# Patient Record
Sex: FEMALE | Race: WHITE | Employment: FULL TIME | URBAN - METROPOLITAN AREA
[De-identification: names, ages, dates, MRNs, and addresses within clinical notes are randomized per-mention and may not be internally consistent; named-entity substitution may affect disease eponyms.]

---

## 2023-11-14 ENCOUNTER — OFFICE VISIT (OUTPATIENT)
Dept: ENT CLINIC | Age: 23
End: 2023-11-14
Payer: COMMERCIAL

## 2023-11-14 VITALS
HEIGHT: 62 IN | DIASTOLIC BLOOD PRESSURE: 75 MMHG | OXYGEN SATURATION: 98 % | HEART RATE: 74 BPM | RESPIRATION RATE: 16 BRPM | WEIGHT: 165 LBS | SYSTOLIC BLOOD PRESSURE: 117 MMHG | BODY MASS INDEX: 30.36 KG/M2

## 2023-11-14 DIAGNOSIS — H93.8X2 SENSATION OF FULLNESS IN LEFT EAR: Primary | ICD-10-CM

## 2023-11-14 DIAGNOSIS — H91.92 HEARING LOSS OF LEFT EAR, UNSPECIFIED HEARING LOSS TYPE: ICD-10-CM

## 2023-11-14 DIAGNOSIS — H93.8X2 POPPING OF LEFT EAR: ICD-10-CM

## 2023-11-14 DIAGNOSIS — H69.92 DYSFUNCTION OF LEFT EUSTACHIAN TUBE: ICD-10-CM

## 2023-11-14 PROCEDURE — 99203 OFFICE O/P NEW LOW 30 MIN: CPT | Performed by: STUDENT IN AN ORGANIZED HEALTH CARE EDUCATION/TRAINING PROGRAM

## 2023-11-14 RX ORDER — NORETHINDRONE ACETATE AND ETHINYL ESTRADIOL .03; 1.5 MG/1; MG/1
1 TABLET ORAL DAILY
COMMUNITY
Start: 2017-12-21

## 2023-11-14 RX ORDER — FLUTICASONE PROPIONATE 50 MCG
2 SPRAY, SUSPENSION (ML) NASAL DAILY
Qty: 16 G | Refills: 3 | Status: SHIPPED | OUTPATIENT
Start: 2023-11-14

## 2023-11-14 RX ORDER — FLUTICASONE PROPIONATE 50 MCG
SPRAY, SUSPENSION (ML) NASAL
COMMUNITY
Start: 2023-10-23 | End: 2023-11-14 | Stop reason: SDUPTHER

## 2023-11-14 RX ORDER — NORETHINDRONE ACETATE AND ETHINYL ESTRADIOL AND FERROUS FUMARATE 1.5-30(21)
KIT ORAL
COMMUNITY
Start: 2023-09-05

## 2023-11-14 NOTE — PROGRESS NOTES
Larry [address] (:  2000) is a 26 y.o. female, here for evaluation of the following chief complaint(s):  Ear Fullness (Bilateral ) and Ear Drainage (Bilateral )      ASSESSMENT/PLAN:  1. Sensation of fullness in left ear  2. Dysfunction of left eustachian tube  3. Popping of left ear  4. Hearing loss of left ear, unspecified hearing loss type      This is a very pleasant 26 y.o. female here today for evaluation of the the above-noted complaints. Patient with left-sided otalgia, aural fullness, popping/clicking, no autophony. Symptoms consistent with left dilatory eustachian tube dysfunction  -Continue fluticasone  -Auto-insufflate ears (\"pop ears\") 4-5 times daily  -Will consider audiogram to determine any conductive hearing loss  -While myringotomy and tympanostomy tube placement is an option, this would be a last resort as this is her first time experiencing this. -RTC in 8 weeks for further evaluation       Medical Decision Making: The following items were considered in medical decision making:  Independent review of images  Review / order clinical lab tests  Review / order radiology tests  Decision to obtain old records  Review and summation of old records as accessed through [facility] if applicable    SUBJECTIVE/OBJECTIVE:  TOM Sellersfrancine is here today for evaluation of ear issues. The patient states that has been ongoing since 2023. Symptoms include left ear fullness, left ear pressure, muffled, mildly decreased hearing and popping or crackling in the left ear. She was seen in the urgent care and told that she had fluid in her ear and started on Flonase. No significant improvement since that time. She reports that she did have a little bit of jaw pain at the time but this is since resolved. The patient has no prior history of ear tubes, recurrent ear infections or other issues.   She does

## 2023-12-13 ENCOUNTER — TELEPHONE (OUTPATIENT)
Dept: ENT CLINIC | Age: 23
End: 2023-12-13

## 2023-12-13 RX ORDER — FLUTICASONE PROPIONATE 50 MCG
2 SPRAY, SUSPENSION (ML) NASAL DAILY
Qty: 16 G | Refills: 3 | Status: SHIPPED | OUTPATIENT
Start: 2023-12-13

## 2023-12-13 NOTE — TELEPHONE ENCOUNTER
Pt calling because she is still having vibrating in her ear. She is unsure if she could continue the flonase that was prescribed at her last visit ( if so, she needs a refill)or schedule an appointment to be re evaluated.

## 2024-01-08 RX ORDER — FLUTICASONE PROPIONATE 50 MCG
2 SPRAY, SUSPENSION (ML) NASAL DAILY
Qty: 16 G | Refills: 3 | Status: SHIPPED | OUTPATIENT
Start: 2024-01-08

## 2024-02-14 ENCOUNTER — OFFICE VISIT (OUTPATIENT)
Dept: FAMILY MEDICINE CLINIC | Age: 24
End: 2024-02-14
Payer: COMMERCIAL

## 2024-02-14 VITALS
SYSTOLIC BLOOD PRESSURE: 118 MMHG | WEIGHT: 165.8 LBS | DIASTOLIC BLOOD PRESSURE: 76 MMHG | HEIGHT: 62 IN | BODY MASS INDEX: 30.51 KG/M2 | TEMPERATURE: 97.2 F

## 2024-02-14 DIAGNOSIS — L50.9 URTICARIAL RASH: ICD-10-CM

## 2024-02-14 DIAGNOSIS — Z86.14 HX OF METHICILLIN RESISTANT STAPHYLOCOCCUS AUREUS INFECTION: Primary | ICD-10-CM

## 2024-02-14 DIAGNOSIS — H93.8X3 EAR POPPING, BILATERAL: ICD-10-CM

## 2024-02-14 PROCEDURE — 99203 OFFICE O/P NEW LOW 30 MIN: CPT

## 2024-02-14 RX ORDER — DOXYCYCLINE HYCLATE 100 MG
100 TABLET ORAL 2 TIMES DAILY
Qty: 20 TABLET | Refills: 0 | Status: SHIPPED | OUTPATIENT
Start: 2024-02-14 | End: 2024-02-24

## 2024-02-14 RX ORDER — TRIAMCINOLONE ACETONIDE 0.25 MG/G
OINTMENT TOPICAL
Qty: 80 G | Refills: 1 | Status: SHIPPED | OUTPATIENT
Start: 2024-02-14 | End: 2024-02-21

## 2024-02-14 NOTE — ASSESSMENT & PLAN NOTE
HX of MRSA infections. Will initiate doxycyline BID 10 days for possible mrsa, continue warm baths. Patient advised to go to ED of symptoms of systemic infection occur, fevers, chills or boil get worse.

## 2024-02-14 NOTE — PROGRESS NOTES
Rosalee Thurman (:  2000) is a 23 y.o. female,Established patient, here for evaluation of the following chief complaint(s):  New Patient, Ear Problem (Crackling in both ears), Rash (Itchy red raised rash on chest), Cyst (History of MRSA), and Fatigue      ASSESSMENT/PLAN:  1. Hx of methicillin resistant Staphylococcus aureus infection  Assessment & Plan:    HX of MRSA infections. Will initiate doxycyline BID 10 days for possible mrsa, continue warm baths. Patient advised to go to ED of symptoms of systemic infection occur, fevers, chills or boil get worse.  Orders:  -     triamcinolone (KENALOG) 0.025 % ointment; Apply topically 2 times daily., Disp-80 g, R-1, Normal  -     doxycycline hyclate (VIBRA-TABS) 100 MG tablet; Take 1 tablet by mouth 2 times daily for 10 days, Disp-20 tablet, R-0Normal  2. Ear popping, bilateral  Assessment & Plan:    Will refer to Dr. Joyce- patient wanted second opinion. Symptoms consistent with possible TMJ, patient having trouble opening jaw. Will go to ENT for further eval.  Orders:  -     Miguel He MD, Otolaryngology, [facility] [place]  3. Urticarial rash  Assessment & Plan:    Kenalog cream BID for 2 weeks and continue claritin. Call if symptoms fail to improve.      Return if symptoms worsen or fail to improve.    SUBJECTIVE/OBJECTIVE:  Patient presents to establish care. PMH signfiicant for MRSa Infections, IBS, anxiety and PTSD. She is a non-smoker. Overall in good health.     She presents today with concern for cyst on labia that has been there for a few weeks. She has a history of MRSA since 4th grade has had a boil on labia previously that needed I&D. She states it is painful, denies swelling, discharge. She is doing warm baths/ oatmeal baths for skin and feels it is getting smaller. Denies fevers, chills. Denies sexual activity or concern for STD.    She has also had a generalized rash on chest and stomach 3-4 weeks. States it is Itchy. Denies any open

## 2024-02-14 NOTE — ASSESSMENT & PLAN NOTE
Will refer to Dr. Joyce- patient wanted second opinion. Symptoms consistent with possible TMJ, patient having trouble opening jaw. Will go to ENT for further eval.

## 2024-02-21 ENCOUNTER — HOSPITAL ENCOUNTER (EMERGENCY)
Age: 24
Discharge: HOME | End: 2024-02-21
Payer: COMMERCIAL

## 2024-02-21 VITALS
SYSTOLIC BLOOD PRESSURE: 126 MMHG | HEIGHT: 62 IN | BODY MASS INDEX: 30.59 KG/M2 | WEIGHT: 166.23 LBS | OXYGEN SATURATION: 97 % | TEMPERATURE: 98.7 F | HEART RATE: 91 BPM | DIASTOLIC BLOOD PRESSURE: 74 MMHG | RESPIRATION RATE: 16 BRPM

## 2024-02-21 DIAGNOSIS — N30.00 ACUTE CYSTITIS WITHOUT HEMATURIA: Primary | ICD-10-CM

## 2024-02-21 LAB
ALBUMIN SERPL-MCNC: 4.2 G/DL (ref 3.4–5)
ALBUMIN/GLOB SERPL: 1.4 {RATIO} (ref 1.1–2.2)
ALP SERPL-CCNC: 108 U/L (ref 40–129)
ALT SERPL-CCNC: 7 U/L (ref 10–40)
ANION GAP SERPL CALCULATED.3IONS-SCNC: 10 MMOL/L (ref 3–16)
AST SERPL-CCNC: 10 U/L (ref 15–37)
BACTERIA URNS QL MICRO: ABNORMAL /HPF
BASOPHILS # BLD: 0.1 K/UL (ref 0–0.2)
BASOPHILS NFR BLD: 0.9 %
BILIRUB SERPL-MCNC: <0.2 MG/DL (ref 0–1)
BILIRUB UR QL STRIP.AUTO: NEGATIVE
BUN SERPL-MCNC: 11 MG/DL (ref 7–20)
CALCIUM SERPL-MCNC: 9.5 MG/DL (ref 8.3–10.6)
CHLORIDE SERPL-SCNC: 105 MMOL/L (ref 99–110)
CLARITY UR: CLEAR
CO2 SERPL-SCNC: 23 MMOL/L (ref 21–32)
COLOR UR: YELLOW
CREAT SERPL-MCNC: 0.6 MG/DL (ref 0.6–1.1)
DEPRECATED RDW RBC AUTO: 12.8 % (ref 12.4–15.4)
EOSINOPHIL # BLD: 0.1 K/UL (ref 0–0.6)
EOSINOPHIL NFR BLD: 0.9 %
EPI CELLS #/AREA URNS AUTO: 6 /HPF (ref 0–5)
GFR SERPLBLD CREATININE-BSD FMLA CKD-EPI: >60 ML/MIN/{1.73_M2}
GLUCOSE SERPL-MCNC: 89 MG/DL (ref 70–99)
GLUCOSE UR STRIP.AUTO-MCNC: NEGATIVE MG/DL
HCT VFR BLD AUTO: 40.3 % (ref 36–48)
HGB BLD-MCNC: 13.5 G/DL (ref 12–16)
HGB UR QL STRIP.AUTO: NEGATIVE
HYALINE CASTS #/AREA URNS AUTO: 0 /LPF (ref 0–8)
KETONES UR STRIP.AUTO-MCNC: NEGATIVE MG/DL
LEUKOCYTE ESTERASE UR QL STRIP.AUTO: ABNORMAL
LYMPHOCYTES # BLD: 3.7 K/UL (ref 1–5.1)
LYMPHOCYTES NFR BLD: 30 %
MCH RBC QN AUTO: 28.5 PG (ref 26–34)
MCHC RBC AUTO-ENTMCNC: 33.6 G/DL (ref 31–36)
MCV RBC AUTO: 84.7 FL (ref 80–100)
MONOCYTES # BLD: 0.8 K/UL (ref 0–1.3)
MONOCYTES NFR BLD: 6.6 %
NEUTROPHILS # BLD: 7.5 K/UL (ref 1.7–7.7)
NEUTROPHILS NFR BLD: 61.6 %
NITRITE UR QL STRIP.AUTO: NEGATIVE
PH UR STRIP.AUTO: 5.5 [PH] (ref 5–8)
PLATELET # BLD AUTO: 403 K/UL (ref 135–450)
PMV BLD AUTO: 8.6 FL (ref 5–10.5)
POTASSIUM SERPL-SCNC: 4.1 MMOL/L (ref 3.5–5.1)
PROT SERPL-MCNC: 7.3 G/DL (ref 6.4–8.2)
PROT UR STRIP.AUTO-MCNC: NEGATIVE MG/DL
RBC # BLD AUTO: 4.75 M/UL (ref 4–5.2)
RBC CLUMPS #/AREA URNS AUTO: 5 /HPF (ref 0–4)
SODIUM SERPL-SCNC: 138 MMOL/L (ref 136–145)
SP GR UR STRIP.AUTO: 1.03 (ref 1–1.03)
UA COMPLETE W REFLEX CULTURE PNL UR: YES
UA DIPSTICK W REFLEX MICRO PNL UR: YES
URN SPEC COLLECT METH UR: ABNORMAL
UROBILINOGEN UR STRIP-ACNC: 0.2 E.U./DL
WBC # BLD AUTO: 12.2 K/UL (ref 4–11)
WBC #/AREA URNS AUTO: 32 /HPF (ref 0–5)

## 2024-02-21 PROCEDURE — 85025 COMPLETE CBC W/AUTO DIFF WBC: CPT

## 2024-02-21 PROCEDURE — 87086 URINE CULTURE/COLONY COUNT: CPT

## 2024-02-21 PROCEDURE — 80053 COMPREHEN METABOLIC PANEL: CPT

## 2024-02-21 PROCEDURE — 81001 URINALYSIS AUTO W/SCOPE: CPT

## 2024-02-21 PROCEDURE — 99283 EMERGENCY DEPT VISIT LOW MDM: CPT

## 2024-02-21 RX ORDER — ONDANSETRON 4 MG/1
4 TABLET, FILM COATED ORAL 3 TIMES DAILY PRN
Qty: 15 TABLET | Refills: 0 | Status: SHIPPED | OUTPATIENT
Start: 2024-02-21

## 2024-02-21 RX ORDER — CEFUROXIME AXETIL 250 MG/1
250 TABLET ORAL 2 TIMES DAILY
Qty: 14 TABLET | Refills: 0 | Status: SHIPPED | OUTPATIENT
Start: 2024-02-21 | End: 2024-02-28

## 2024-02-21 ASSESSMENT — PAIN DESCRIPTION - LOCATION: LOCATION: ABDOMEN;VAGINA

## 2024-02-21 ASSESSMENT — PAIN - FUNCTIONAL ASSESSMENT
PAIN_FUNCTIONAL_ASSESSMENT: 0-10
PAIN_FUNCTIONAL_ASSESSMENT: 0-10

## 2024-02-21 ASSESSMENT — PAIN SCALES - GENERAL
PAINLEVEL_OUTOF10: 4
PAINLEVEL_OUTOF10: 2

## 2024-02-21 NOTE — ED PROVIDER NOTES
below, none     Procedures    CRITICAL CARE TIME (.cctime)       PAST MEDICAL HISTORY      has a past medical history of Acid reflux, Anxiety, Irritable bowel syndrome, and MRSA (methicillin resistant staph aureus) culture positive.     EMERGENCY DEPARTMENT COURSE and DIFFERENTIAL DIAGNOSIS/MDM:   Vitals:    Vitals:    02/21/24 1127   BP: 126/74   Pulse: 91   Resp: 16   Temp: 98.7 °F (37.1 °C)   TempSrc: Oral   SpO2: 97%   Weight: 75.4 kg (166 lb 3.6 oz)   Height: 1.575 m (5' 2\")       Patient was given the following medications:  Medications - No data to display          Is this patient to be included in the SEP-1 Core Measure due to severe sepsis or septic shock?   No   Exclusion criteria - the patient is NOT to be included for SEP-1 Core Measure due to:  Infection is not suspected        Chronic Conditions affecting care:    has a past medical history of Acid reflux, Anxiety, Irritable bowel syndrome, and MRSA (methicillin resistant staph aureus) culture positive.    CONSULTS: (Who and What was discussed)  None      Records Reviewed (External and Source)     CC/HPI Summary, DDx, ED Course, and Reassessment:   Patient is a 23-year-old female presenting to the ED with a concern of of an abscess on the left labia that started 2 weeks ago, patient mentioned last Thursday and rupture and she noticed purulent discharge.  Denies fevers, abdominal pain, vaginal discharge, vaginal bleeding, dysuria, hematuria, bowel symptoms.  Patient arrived stable to the ED.    Ddx: Abscess, cellulitis, UTI, others.    Patient arrived stable to the ED.  Physical as above    Physical examination showed an alert and oriented female  , nontachycardic, afebrile  Lungs were clear to auscultation.  Heart was clear to auscultation, no murmurs noted.  Examination of the left labia showed a very small hardened cyst no induration, no warmth, no redness, no inflammation noted.    Patient presented anxious and requested basic labs with

## 2024-02-22 LAB — BACTERIA UR CULT: NORMAL

## 2024-03-01 ENCOUNTER — OFFICE VISIT (OUTPATIENT)
Dept: FAMILY MEDICINE CLINIC | Age: 24
End: 2024-03-01
Payer: COMMERCIAL

## 2024-03-01 VITALS
SYSTOLIC BLOOD PRESSURE: 102 MMHG | BODY MASS INDEX: 30.36 KG/M2 | WEIGHT: 165 LBS | TEMPERATURE: 97.5 F | DIASTOLIC BLOOD PRESSURE: 72 MMHG | HEIGHT: 62 IN

## 2024-03-01 DIAGNOSIS — Z86.14 HX OF METHICILLIN RESISTANT STAPHYLOCOCCUS AUREUS INFECTION: ICD-10-CM

## 2024-03-01 DIAGNOSIS — N39.0 URINARY TRACT INFECTION WITHOUT HEMATURIA, SITE UNSPECIFIED: Primary | ICD-10-CM

## 2024-03-01 DIAGNOSIS — K59.00 CONSTIPATION, UNSPECIFIED CONSTIPATION TYPE: ICD-10-CM

## 2024-03-01 LAB
BILIRUBIN, POC: NEGATIVE
BLOOD URINE, POC: NEGATIVE
CLARITY, POC: CLEAR
COLOR, POC: YELLOW
GLUCOSE URINE, POC: NEGATIVE
KETONES, POC: NEGATIVE
LEUKOCYTE EST, POC: NEGATIVE
NITRITE, POC: NEGATIVE
PH, POC: 6.5
PROTEIN, POC: NEGATIVE
SPECIFIC GRAVITY, POC: 1.02
UROBILINOGEN, POC: 0.2

## 2024-03-01 PROCEDURE — 81002 URINALYSIS NONAUTO W/O SCOPE: CPT

## 2024-03-01 PROCEDURE — 99212 OFFICE O/P EST SF 10 MIN: CPT

## 2024-03-01 ASSESSMENT — ENCOUNTER SYMPTOMS
SHORTNESS OF BREATH: 0
SINUS PRESSURE: 0
DIARRHEA: 0
SORE THROAT: 0
WHEEZING: 0
BACK PAIN: 0
CONSTIPATION: 0
APNEA: 0
BLOOD IN STOOL: 0
ABDOMINAL PAIN: 0
TROUBLE SWALLOWING: 0
NAUSEA: 0
COLOR CHANGE: 0
COUGH: 0
VOMITING: 0

## 2024-03-01 NOTE — ASSESSMENT & PLAN NOTE
UA in office negative, infection cleared. Advised on continuing hydration, proper hygiene techniques.

## 2024-03-01 NOTE — ASSESSMENT & PLAN NOTE
Patient continues to get MRSA infections, will refer to ID for further evaluation and management

## 2024-03-01 NOTE — PROGRESS NOTES
Rosalee Thurman (:  2000) is a 23 y.o. female,Established patient, here for evaluation of the following chief complaint(s):  ED Follow-up (ED follow up- UTI.  Patient given Ceftin 250 mg twice daily x 7 days and Zofran)      ASSESSMENT/PLAN:  1. Urinary tract infection without hematuria, site unspecified  Assessment & Plan:    UA in office negative, infection cleared. Advised on continuing hydration, proper hygiene techniques.  Orders:  -     POCT Urinalysis no Micro  2. Constipation, unspecified constipation type  Assessment & Plan:    Recommend inc fluid intake, can continue miralax. Also advise to restart probiotic for gut microbiome. Referral to GI  Orders:  -     Simeon Bond MD, Gastroenterology (ERCP & EUS), [facility]  3. Hx of methicillin resistant Staphylococcus aureus infection  Assessment & Plan:    Patient continues to get MRSA infections, will refer to ID for further evaluation and management  Orders:  -     Jhoana Clay MD, Infectious Disease, [facility] [place]      Return if symptoms worsen or fail to improve.    SUBJECTIVE/OBJECTIVE:  Patient was seen in ED last week after cyst on labia had popped, concern for infection. Patient was found to have a UTI, started on ceftin and given zofran for nausea. Vitals and other lab work normal in ED.  Labia cyst without infection.    Following up today s/p antibiotics. She is still fatigued, but denies fevers, dysuria, hematuria. No nausea anymore. The labia cyst has been healing, denies any open wounds or drainage.     Rash on abdomen cleared up last week.    She continues to have constipation, worse after antibiotics. She has a hx of EGD, bowel issues in past. She has been taking miralax but states its not helping. She has tried probiotics in past but didn't help, watching diet. Would like to see GI.        Current Outpatient Medications   Medication Sig Dispense Refill    Loratadine (CLARITIN PO) Take by mouth

## 2024-03-01 NOTE — ASSESSMENT & PLAN NOTE
Recommend inc fluid intake, can continue miralax. Also advise to restart probiotic for gut microbiome. Referral to GI

## 2024-03-27 ENCOUNTER — TELEMEDICINE (OUTPATIENT)
Dept: INFECTIOUS DISEASES | Age: 24
End: 2024-03-27
Payer: COMMERCIAL

## 2024-03-27 DIAGNOSIS — Z22.322 MRSA COLONIZATION: ICD-10-CM

## 2024-03-27 DIAGNOSIS — Z86.14 HX OF METHICILLIN RESISTANT STAPHYLOCOCCUS AUREUS INFECTION: ICD-10-CM

## 2024-03-27 DIAGNOSIS — L08.9 RECURRENT INFECTION OF SKIN: Primary | ICD-10-CM

## 2024-03-27 PROCEDURE — 99203 OFFICE O/P NEW LOW 30 MIN: CPT | Performed by: INTERNAL MEDICINE

## 2024-03-27 RX ORDER — CHLORHEXIDINE GLUCONATE 213 G/1000ML
SOLUTION TOPICAL
Qty: 1 EACH | Refills: 0 | Status: SHIPPED | OUTPATIENT
Start: 2024-03-27 | End: 2024-04-10

## 2024-03-27 NOTE — PROGRESS NOTES
Infectious Diseases Clinic Consult Note Virtual Visit        Reason for Consult:  For MRSA infection    Requesting Physician:  Efren CHAUDHRY  Primary Care Physician:  Rosaura Ugarte APRN - NP  History Obtained From:   Patient , Medical Records     CHIEF COMPLAINT:    Chief Complaint   Patient presents with    New Patient     MRSA, Referred by INDIO Flores. -nk       HISTORY OF PRESENT ILLNESS: 23 y.o. female Here for evaluation of MRSA infection she has a h/o recurrent skin boils was told it was MRSA nearly 10 YRS ago and she gets them in the vaginal area and had to use warm compress and break the lesions some times cx from [facility] in 2011 reviewed was MRSA - AND she was treated with Doxycycline in Feb 2024 for skin infection. She has no new open wounds did no require Hospitalizations. She is concerned about recurrences.     Past Medical History:    Past Medical History:   Diagnosis Date    Acid reflux     Anxiety     Irritable bowel syndrome     MRSA (methicillin resistant staph aureus) culture positive        Past Surgical History:    No past surgical history on file.    Current Medications:    Current Outpatient Medications   Medication Sig Dispense Refill    chlorhexidine (HIBICLENS) 4 % external liquid Apply topically daily below the neck, front, back and extremities with a clean cloth and wipe your body for x 5 days  DO NOT APPLY OVER THE HEAD/FACE AREA. 1 each 0    Loratadine (CLARITIN PO) Take by mouth      EDWARD ALIS 1.5/30 1.5-30 MG-MCG tablet        No current facility-administered medications for this visit.       Allergies:  Patient has no known allergies.      Immunizations :   Immunization History   Administered Date(s) Administered    COVID-19, MODERNA Bivalent, (age 12y+), IM, 50 mcg/0.5 mL 10/20/2022    COVID-19, MODERNA, (2023-24 formula), (age 12y+), IM, 50mcg/0.5mL 10/23/2023    COVID-19, PFIZER PURPLE top, DILUTE for use, (age 12 y+), 30mcg/0.3mL 03/26/2021,

## 2024-04-02 ENCOUNTER — TELEPHONE (OUTPATIENT)
Dept: INFECTIOUS DISEASES | Age: 24
End: 2024-04-02

## 2024-04-02 NOTE — TELEPHONE ENCOUNTER
Received voicemail asking who will do pt's nasal cx.    Call laced to pt, voicemail left with instructions to go to nearest [facility] lab to pt. Return call requested to verify understanding.

## 2024-04-05 LAB — MRSA SPEC QL CULT: NORMAL

## 2024-04-06 NOTE — RESULT ENCOUNTER NOTE
MRSA cultures from the nose Negative - thx  She can consider Mupirocin treatment with  any future flare ups for now tested Negative

## 2024-08-08 ENCOUNTER — TELEPHONE (OUTPATIENT)
Dept: FAMILY MEDICINE CLINIC | Age: 24
End: 2024-08-08

## 2024-08-08 NOTE — TELEPHONE ENCOUNTER
Patient aunt was diagnosed with Ever-Danlos syndrome, Rosalee is asking if she should be concerned and if she should get tested?    Please advise, [phone]

## 2024-08-09 NOTE — TELEPHONE ENCOUNTER
Hello, Ever danlos is quite rare, and typically would be more of a concern if a first degree relative (mom/dad) were diagnosed with this. Unless she is experiencing chronic fatigue, chronic body aches, dizziness, loose unstable joints, I do not believe testing would be indicated for the time being.